# Patient Record
Sex: FEMALE | Race: WHITE | Employment: PART TIME | URBAN - METROPOLITAN AREA
[De-identification: names, ages, dates, MRNs, and addresses within clinical notes are randomized per-mention and may not be internally consistent; named-entity substitution may affect disease eponyms.]

---

## 2019-03-08 ENCOUNTER — HOSPITAL ENCOUNTER (OUTPATIENT)
Age: 39
Setting detail: SPECIMEN
Discharge: HOME | End: 2019-03-08
Payer: MEDICARE

## 2019-03-08 DIAGNOSIS — R35.0 URINARY FREQUENCY: ICD-10-CM

## 2019-03-09 LAB
CULTURE: NORMAL
Lab: NORMAL
SPECIMEN DESCRIPTION: NORMAL

## 2019-04-24 ENCOUNTER — HOSPITAL ENCOUNTER (INPATIENT)
Age: 39
LOS: 1 days | Discharge: HOME | DRG: 773 | End: 2019-04-26
Attending: EMERGENCY MEDICINE | Admitting: INTERNAL MEDICINE
Payer: MEDICARE

## 2019-04-24 ENCOUNTER — APPOINTMENT (OUTPATIENT)
Dept: CT IMAGING | Age: 39
DRG: 773 | End: 2019-04-24
Payer: MEDICARE

## 2019-04-24 DIAGNOSIS — F13.939 BENZODIAZEPINE WITHDRAWAL WITH COMPLICATION (HCC): ICD-10-CM

## 2019-04-24 DIAGNOSIS — R56.9 NEW ONSET SEIZURE (HCC): Primary | ICD-10-CM

## 2019-04-24 LAB
ABSOLUTE EOS #: 0 K/UL (ref 0–0.4)
ABSOLUTE EOS #: 0 K/UL (ref 0–0.4)
ABSOLUTE IMMATURE GRANULOCYTE: ABNORMAL K/UL (ref 0–0.3)
ABSOLUTE IMMATURE GRANULOCYTE: ABNORMAL K/UL (ref 0–0.3)
ABSOLUTE LYMPH #: 1.9 K/UL (ref 1–4.8)
ABSOLUTE LYMPH #: 2.28 K/UL (ref 1–4.8)
ABSOLUTE MONO #: 0.3 K/UL (ref 0.1–1.3)
ABSOLUTE MONO #: 0.46 K/UL (ref 0.1–1.3)
ACETAMINOPHEN LEVEL: <5 UG/ML (ref 10–30)
ALBUMIN SERPL-MCNC: 4.6 G/DL (ref 3.5–5.2)
ALBUMIN/GLOBULIN RATIO: ABNORMAL (ref 1–2.5)
ALP BLD-CCNC: 93 U/L (ref 35–104)
ALT SERPL-CCNC: 71 U/L (ref 5–33)
ANION GAP SERPL CALCULATED.3IONS-SCNC: 18 MMOL/L (ref 9–17)
AST SERPL-CCNC: 81 U/L
BASOPHILS # BLD: 0 % (ref 0–2)
BASOPHILS # BLD: 0 % (ref 0–2)
BASOPHILS ABSOLUTE: 0 K/UL (ref 0–0.2)
BASOPHILS ABSOLUTE: 0 K/UL (ref 0–0.2)
BILIRUB SERPL-MCNC: 0.23 MG/DL (ref 0.3–1.2)
BUN BLDV-MCNC: 8 MG/DL (ref 6–20)
BUN/CREAT BLD: ABNORMAL (ref 9–20)
CALCIUM SERPL-MCNC: 9.2 MG/DL (ref 8.6–10.4)
CHLORIDE BLD-SCNC: 101 MMOL/L (ref 98–107)
CO2: 20 MMOL/L (ref 20–31)
CREAT SERPL-MCNC: 0.9 MG/DL (ref 0.5–0.9)
DIFFERENTIAL TYPE: ABNORMAL
DIFFERENTIAL TYPE: ABNORMAL
EOSINOPHILS RELATIVE PERCENT: 0 % (ref 0–4)
EOSINOPHILS RELATIVE PERCENT: 0 % (ref 0–4)
ETHANOL PERCENT: <0.01 %
ETHANOL: <10 MG/DL
GFR AFRICAN AMERICAN: >60 ML/MIN
GFR NON-AFRICAN AMERICAN: >60 ML/MIN
GFR SERPL CREATININE-BSD FRML MDRD: ABNORMAL ML/MIN/{1.73_M2}
GFR SERPL CREATININE-BSD FRML MDRD: ABNORMAL ML/MIN/{1.73_M2}
GLUCOSE BLD-MCNC: 126 MG/DL (ref 65–105)
GLUCOSE BLD-MCNC: 136 MG/DL (ref 70–99)
HCT VFR BLD CALC: 35.8 % (ref 36–46)
HCT VFR BLD CALC: 36.5 % (ref 36–46)
HEMOGLOBIN: 11.3 G/DL (ref 12–16)
HEMOGLOBIN: 11.5 G/DL (ref 12–16)
IMMATURE GRANULOCYTES: ABNORMAL %
IMMATURE GRANULOCYTES: ABNORMAL %
LYMPHOCYTES # BLD: 19 % (ref 24–44)
LYMPHOCYTES # BLD: 20 % (ref 24–44)
MCH RBC QN AUTO: 24.4 PG (ref 26–34)
MCH RBC QN AUTO: 24.4 PG (ref 26–34)
MCHC RBC AUTO-ENTMCNC: 31.5 G/DL (ref 31–37)
MCHC RBC AUTO-ENTMCNC: 31.6 G/DL (ref 31–37)
MCV RBC AUTO: 77.2 FL (ref 80–100)
MCV RBC AUTO: 77.6 FL (ref 80–100)
MONOCYTES # BLD: 3 % (ref 1–7)
MONOCYTES # BLD: 4 % (ref 1–7)
MORPHOLOGY: ABNORMAL
NRBC AUTOMATED: ABNORMAL PER 100 WBC
NRBC AUTOMATED: ABNORMAL PER 100 WBC
PDW BLD-RTO: 16.7 % (ref 11.5–14.9)
PDW BLD-RTO: 17 % (ref 11.5–14.9)
PLATELET # BLD: 454 K/UL (ref 150–450)
PLATELET # BLD: 484 K/UL (ref 150–450)
PLATELET ESTIMATE: ABNORMAL
PLATELET ESTIMATE: ABNORMAL
PMV BLD AUTO: 7.2 FL (ref 6–12)
PMV BLD AUTO: 7.5 FL (ref 6–12)
POTASSIUM SERPL-SCNC: 3.8 MMOL/L (ref 3.7–5.3)
RBC # BLD: 4.63 M/UL (ref 4–5.2)
RBC # BLD: 4.7 M/UL (ref 4–5.2)
RBC # BLD: ABNORMAL 10*6/UL
RBC # BLD: ABNORMAL 10*6/UL
SALICYLATE LEVEL: <1 MG/DL (ref 3–10)
SEG NEUTROPHILS: 76 % (ref 36–66)
SEG NEUTROPHILS: 78 % (ref 36–66)
SEGMENTED NEUTROPHILS ABSOLUTE COUNT: 7.8 K/UL (ref 1.3–9.1)
SEGMENTED NEUTROPHILS ABSOLUTE COUNT: 8.66 K/UL (ref 1.3–9.1)
SODIUM BLD-SCNC: 139 MMOL/L (ref 135–144)
TOTAL PROTEIN: 7.8 G/DL (ref 6.4–8.3)
TOXIC TRICYCLIC SC,BLOOD: ABNORMAL
WBC # BLD: 10 K/UL (ref 3.5–11)
WBC # BLD: 11.4 K/UL (ref 3.5–11)
WBC # BLD: ABNORMAL 10*3/UL
WBC # BLD: ABNORMAL 10*3/UL

## 2019-04-24 PROCEDURE — 99223 1ST HOSP IP/OBS HIGH 75: CPT | Performed by: INTERNAL MEDICINE

## 2019-04-24 PROCEDURE — 80053 COMPREHEN METABOLIC PANEL: CPT

## 2019-04-24 PROCEDURE — G0378 HOSPITAL OBSERVATION PER HR: HCPCS

## 2019-04-24 PROCEDURE — 2580000003 HC RX 258: Performed by: STUDENT IN AN ORGANIZED HEALTH CARE EDUCATION/TRAINING PROGRAM

## 2019-04-24 PROCEDURE — G0480 DRUG TEST DEF 1-7 CLASSES: HCPCS

## 2019-04-24 PROCEDURE — 99254 IP/OBS CNSLTJ NEW/EST MOD 60: CPT | Performed by: PSYCHIATRY & NEUROLOGY

## 2019-04-24 PROCEDURE — 82947 ASSAY GLUCOSE BLOOD QUANT: CPT

## 2019-04-24 PROCEDURE — 93005 ELECTROCARDIOGRAM TRACING: CPT

## 2019-04-24 PROCEDURE — 70450 CT HEAD/BRAIN W/O DYE: CPT

## 2019-04-24 PROCEDURE — 99285 EMERGENCY DEPT VISIT HI MDM: CPT

## 2019-04-24 PROCEDURE — 36415 COLL VENOUS BLD VENIPUNCTURE: CPT

## 2019-04-24 PROCEDURE — 6370000000 HC RX 637 (ALT 250 FOR IP): Performed by: STUDENT IN AN ORGANIZED HEALTH CARE EDUCATION/TRAINING PROGRAM

## 2019-04-24 PROCEDURE — 80307 DRUG TEST PRSMV CHEM ANLYZR: CPT

## 2019-04-24 PROCEDURE — 85025 COMPLETE CBC W/AUTO DIFF WBC: CPT

## 2019-04-24 RX ORDER — DIAZEPAM 5 MG/1
10 TABLET ORAL EVERY 8 HOURS
Status: DISCONTINUED | OUTPATIENT
Start: 2019-04-24 | End: 2019-04-26 | Stop reason: HOSPADM

## 2019-04-24 RX ORDER — LORAZEPAM 2 MG/ML
4 INJECTION INTRAMUSCULAR
Status: DISCONTINUED | OUTPATIENT
Start: 2019-04-24 | End: 2019-04-24

## 2019-04-24 RX ORDER — LORAZEPAM 1 MG/1
1 TABLET ORAL
Status: DISCONTINUED | OUTPATIENT
Start: 2019-04-24 | End: 2019-04-24

## 2019-04-24 RX ORDER — LORAZEPAM 2 MG/ML
2 INJECTION INTRAMUSCULAR
Status: DISCONTINUED | OUTPATIENT
Start: 2019-04-24 | End: 2019-04-24

## 2019-04-24 RX ORDER — ONDANSETRON 2 MG/ML
4 INJECTION INTRAMUSCULAR; INTRAVENOUS EVERY 6 HOURS PRN
Status: DISCONTINUED | OUTPATIENT
Start: 2019-04-24 | End: 2019-04-26 | Stop reason: HOSPADM

## 2019-04-24 RX ORDER — SODIUM CHLORIDE 0.9 % (FLUSH) 0.9 %
10 SYRINGE (ML) INJECTION PRN
Status: DISCONTINUED | OUTPATIENT
Start: 2019-04-24 | End: 2019-04-26 | Stop reason: HOSPADM

## 2019-04-24 RX ORDER — LORAZEPAM 1 MG/1
2 TABLET ORAL
Status: DISCONTINUED | OUTPATIENT
Start: 2019-04-24 | End: 2019-04-24

## 2019-04-24 RX ORDER — LORAZEPAM 2 MG/ML
1 INJECTION INTRAMUSCULAR
Status: DISCONTINUED | OUTPATIENT
Start: 2019-04-24 | End: 2019-04-24

## 2019-04-24 RX ORDER — LORAZEPAM 1 MG/1
4 TABLET ORAL
Status: DISCONTINUED | OUTPATIENT
Start: 2019-04-24 | End: 2019-04-24

## 2019-04-24 RX ORDER — LORAZEPAM 1 MG/1
3 TABLET ORAL
Status: DISCONTINUED | OUTPATIENT
Start: 2019-04-24 | End: 2019-04-24

## 2019-04-24 RX ORDER — LORAZEPAM 2 MG/ML
3 INJECTION INTRAMUSCULAR
Status: DISCONTINUED | OUTPATIENT
Start: 2019-04-24 | End: 2019-04-24

## 2019-04-24 RX ORDER — SODIUM CHLORIDE 0.9 % (FLUSH) 0.9 %
10 SYRINGE (ML) INJECTION EVERY 12 HOURS SCHEDULED
Status: DISCONTINUED | OUTPATIENT
Start: 2019-04-24 | End: 2019-04-26 | Stop reason: HOSPADM

## 2019-04-24 RX ADMIN — Medication 10 ML: at 20:50

## 2019-04-24 RX ADMIN — DIAZEPAM 10 MG: 5 TABLET ORAL at 20:50

## 2019-04-24 ASSESSMENT — ENCOUNTER SYMPTOMS
CONSTIPATION: 0
DIARRHEA: 0
SHORTNESS OF BREATH: 0
CHEST TIGHTNESS: 0
BLOOD IN STOOL: 0
VOMITING: 0
PHOTOPHOBIA: 0
ABDOMINAL PAIN: 0
WHEEZING: 0
CHOKING: 0
NAUSEA: 0
COUGH: 0

## 2019-04-24 NOTE — ED NOTES
Report given to Babak Mei from [facility]. Report method in person   The following was reviewed with receiving RN:   Current vital signs:  /70   Pulse 82   Temp 97.7 °F (36.5 °C) (Oral)   Resp 16   Ht 5' 7\" (1.702 m)   Wt 203 lb (92.1 kg)   LMP 03/24/2019 (Approximate)   SpO2 94%   BMI 31.79 kg/m²                MEWS Score: 2     Any medication or safety alerts were reviewed. Any pending diagnostics and notifications were also reviewed, as well as any safety concerns or issues, abnormal labs, abnormal imaging, and abnormal assessment findings. Questions were answered.           Mine Pires RN  04/24/19 5546

## 2019-04-24 NOTE — CONSULTS
NEUROLOGY INPATIENT CONSULT NOTE    4/24/2019         Ekaterina Longoria is a  41 y.o. female admitted on 4/24/2019 with  Benzodiazepine withdrawal with complication ([facility] [address] [P25.328]      History is obtained mostly from the patient and the medical record and from the caregivers. Chart is reviewed and patient is examined. Briefly, this is a  41 y.o. right handed  female with hx of anxiety and depression was admitted on 4/24/2019 with new onset seizures. Patient was at her doctor's office for her children and she started seizing and has had a postictal state for 30 minutes. Patient could not recall any details of it. Patient was told by her mom, who witnessed the seizure. It was described as generalized tonic clonic seizure. There is no report of tongue bite or bladder incontinence. Patient denied hx of head injury or family hx of seizure disorder. Patient has been on 3-5 mg of xanax and getting off of the street. She stated that she was on Klonopin in the past for chronic anxiety as prescribed by her psychiatrist.  She was fired from the practice for noncompliance. Subsequently she started taking Xanax from wherever she can get it. About 2 days ago she decided to stop Xanax. Vitals on admit: 119/69, 109/min; 97.7F. Head CT on admit is negative. Presently she denies headaches and dizziness and vision changes. But admits to having generalized weakness but denied numbness. Denies speech and swallowing difficulties. No current facility-administered medications on file prior to encounter. Current Outpatient Medications on File Prior to Encounter   Medication Sig Dispense Refill    ALPRAZolam (XANAX) 1 MG tablet Xanax 1 mg tablet   Take 1 tablet every day by oral route as needed.  bisacodyl (DULCOLAX) 5 MG EC tablet Take 5 mg by mouth daily as needed for Constipation       methadone (DOLOPHINE) 10 MG/ML solution Take 135 mg by mouth daily.  [facility]      traZODone (DESYREL) 100 MG tablet Take 100 mg by mouth nightly       naproxen (NAPROSYN) 500 MG tablet Take 1 tablet by mouth 2 times daily (with meals) 60 tablet 0    sodium chloride 0.9 % SOLN with methadone 10 MG/ML SOLN 1 mg/mL methadone      chlorzoxazone (LORZONE) 750 MG TABS tablet Take 1 tablet by mouth 3 times daily as needed for Muscle spasms 8 tablet 0     Allergies: Ana Cristina Longoria is allergic to codeine. Past Medical History:   Diagnosis Date    Anxiety     Bipolar disorder ([facility] [address]     Depression     Hypothyroidism     Obesity     Substance abuse ([facility]        Past Surgical History:   Procedure Laterality Date     SECTION  12/10/2017    1     Social History: Ana Cristina Longoria  reports that she has been smoking cigarettes. She has never used smokeless tobacco. She reports that she drank alcohol. She reports that she has current or past drug history. History reviewed. No pertinent family history. Current Medications:      PRN Meds include:     ROS:   Constitutional Negative for fever and chills   HEENT Negative for ear discharge, ear pain, nosebleed   Eyes Negative for photophobia, pain and discharge   Respiratory Negative for hemoptysis and sputum   Cardiovascular Negative for orthopnea, claudication and PND   Gastrointestinal Negative for abdominal pain, diarrhea, blood in stool   Musculoskeletal Negative for joint pain, negative for myalgia   Skin Negative for rash or itching   Endo/heme/allergies Negative for polydipsia, environmental allergy   Psychiatric Negative for suicidal ideation.   Patient is anxious           Objective:   /66   Pulse 88   Temp 98.4 °F (36.9 °C) (Oral)   Resp 17   Ht 5' 7\" (1.702 m)   Wt 203 lb (92.1 kg)   LMP 2019 (Approximate)   SpO2 95%   BMI 31.79 kg/m²     Blood pressure range: Systolic (42PZK), MALOU:012 , Min:113 , THR:793   ; Diastolic (55ERC), QOO:94, Min:63, Max:76      Continuous infusions:     ON EXAMINATION:  GENERAL  Appears comfortable and in no distress   HEENT  NC/ AT   NECK  Supple and no bruits heard   Cardiovascular  S1, S2 heard; radial pulse intact   MENTAL STATUS:  Alert, oriented, intact memory, no confusion, normal speech, normal language, no hallucination or delusion   CRANIAL NERVES: II     -       Pupils reactive b/l; Fundus exam: intact venous pulsations; Visual fields intact to confrontation  III,IV,VI -  EOMs full, no afferent defect, no                      SHOBHA, no ptosis  V     -     Normal facial sensation  VII    -     Normal facial symmetry  VIII   -     Intact hearing  IX,X -     Symmetrical palate  XI    -     Symmetrical shoulder shrug  XII   -     Midline tongue, no atrophy    MOTOR FUNCTION:  Normal bulk, normal tone, normal power;  no involuntary movements, no tremor   SENSORY FUNCTION:  Normal touch, normal pin, normal vibration, normal proprioception   CEREBELLAR FUNCTION:  Intact fine motor control over upper limbs   REFLEX FUNCTION:  Symmetric, no perverted reflex, no Babinski sign   STATION and GAIT  Not tested         Data:    Lab Results:     Lab Results   Component Value Date    ALT 71 (H) 04/24/2019    AST 81 (H) 04/24/2019     Hematology:  Recent Labs     04/24/19  1357   WBC 10.0   HGB 11.5*   HCT 36.5   *     Chemistry:  Recent Labs     04/24/19  1357      K 3.8      CO2 20   GLUCOSE 136*   BUN 8   CREATININE 0.90   CALCIUM 9.2     Recent Labs     04/24/19  1357   PROT 7.8   LABALBU 4.6   AST 81*   ALT 71*       No results found for: PHENYTOIN, PHENYTOIN, VALPROATE, CBMZ        Diagnostic data reviewed:  CT head 4/24/19: No acute intracranial abnormality. Toxicology 4/24/19: pending. Impression and Plan: Ms. Dulce Esposito is a 41 y.o. female with   New onset seizure; benzodiazepine withdrawal seizure; would not recommend initiating her on AED. will get EEG and decide accordingly.  Until then, continue seizure precautions; IV Lorazepam 1mg q 30 min prn prolonged seizure activity, max 4mg/ day  Chronic anxiety disorder/ bipolar disorder; might benefit from psych eval and also to initiate clonazepam 0.5 mg bid but I will defer it to primary team.   Hx of substance abuse    Discussed with patient and Nurse. Will follow with you. Thank you for consultation.

## 2019-04-24 NOTE — ED NOTES
Pt arrives via EMS. Per report, pt at MD office for kamla appt- sitting in waiting room when she lost consciousness/began foaming at the mouth. Pt does not remember event. Pt denies seizure HX. Pt AOx4. RR easy, unlabored. FSBG 126. Pt denies any pain. Per witness report, pt did not strike head- event occurred while pt sitting in chair.       Sharyle Messing, RN  04/24/19 6773

## 2019-04-24 NOTE — PROGRESS NOTES
Pt arrived to floor via stretcher from ED and was transfered to bed. Vitals taken. Admission and assessment complete EXCEPT FOR HOME MED LIST REVIEW BECAUSE THE PATIENT DIDN'T KNOW HER MEDICATIONS. No distress noted. See doc flowsheet and admission navigator for details. POC and education initiated and reviewed with patient. Call light within reach, and pt educated on its use. Bed in lowest position, and locked. Side rails up x 2. Denied further questions or needs at this time. Will continue to monitor.

## 2019-04-24 NOTE — ED PROVIDER NOTES
[address] [facility]  eMERGENCY dEPARTMENT eNCOUnter      Pt Name: Ledy Schultz  MRN: [id number]  [place] 1980  Date of evaluation: 19      CHIEF COMPLAINT     Generalized tonic-clonic seizure. HISTORY OF PRESENT ILLNESS   HPI 41 y.o. female presents with complaints of a generalized tonic-clonic seizure. The patient states that she was at the doctor's office, where she had taken her children to get evaluated for cold, when she went into a generalized tonic-clonic seizure while sitting in the waiting room. She did not receive any medications. EMS was called and she was transported to the emergency department. Patient denies any seizure history. She says that she's been buying Xanax off the street for the last year and a half, taking about 3 mg a day for anxiety. She says that she ran out of Xanax about 3 days ago, and that she didn't sleep at all last night because her children are sick with cold. She denies any fevers or chills or headache. She denies biting her tongue. There is no bowel or bladder incontinence. She denies any other recreational drug abuse. REVIEW OF SYSTEMS     Review of Systems   Constitutional: Negative for chills and fever. HENT: Negative for congestion. Eyes: Negative for visual disturbance. Respiratory: Negative for cough. Cardiovascular: Negative for chest pain. Gastrointestinal: Negative for abdominal pain, nausea and vomiting. Genitourinary: Negative for dysuria. Musculoskeletal: Negative for arthralgias. Skin: Negative for rash. Neurological: Positive for seizures. Negative for headaches. Hematological: Negative for adenopathy.          PAST MEDICAL HISTORY     Past Medical History:   Diagnosis Date    Anxiety     Bipolar disorder ([facility] [address]     Depression     Hypothyroidism     Obesity     Substance abuse ([facility] [address]        SURGICAL HISTORY       Past Surgical History:   Procedure Laterality Date     SECTION  12/10/2017    1       CURRENT MEDICATIONS       Current Discharge Medication List      CONTINUE these medications which have NOT CHANGED    Details   bisacodyl (DULCOLAX) 5 MG EC tablet Take 5 mg by mouth daily as needed for Constipation       methadone (DOLOPHINE) 10 MG/ML solution Take 135 mg by mouth daily. [facility]      traZODone (DESYREL) 100 MG tablet Take 100 mg by mouth nightly       naproxen (NAPROSYN) 500 MG tablet Take 1 tablet by mouth 2 times daily (with meals)  Qty: 60 tablet, Refills: 0    Associated Diagnoses: Chronic bilateral low back pain without sciatica      sodium chloride 0.9 % SOLN with methadone 10 MG/ML SOLN 1 mg/mL methadone             ALLERGIES     is allergic to codeine. FAMILY HISTORY     has no family status information on file. SOCIAL HISTORY      reports that she has been smoking cigarettes. She has never used smokeless tobacco. She reports that she drank alcohol. She reports that she has current or past drug history.     PHYSICAL EXAM     INITIAL VITALS: /64   Pulse 74   Temp 98.1 °F (36.7 °C) (Oral)   Resp 17   Ht 5' 7\" (1.702 m)   Wt 203 lb (92.1 kg)   LMP 03/24/2019 (Approximate)   SpO2 97%   BMI 31.79 kg/m²   Gen.: NAD  Head: Normocephalic, atraumatic  Eye: Pupils equal round reactive to light, no conjunctivitis  Heart: Regular rate and rhythm no murmurs  Lungs: Clear to auscultation bilaterally, no respiratory distress  Chest wall: No crepitus, no tenderness palpation  Abdomen: Soft, nontender, nondistended, with no peritoneal signs  MSK: No CVA TTP  Neurologic: Patient is alert and oriented x3,   Cranial nerves II through XII are normal  motor and sensation is intact in all 4 extremities, cerebellar function is normal finger-nose testing, and heel-to-shin testing  Speech is fluent  Extremities: Full range of motion, no cyanosis, no edema, no signs of trauma, no tenderness to palpation    MEDICAL DECISION MAKING:     MDM  This is a 62-year-old presenting with a suspected benzodiazepine withdrawal seizure. We'll get a CT scan of the head to make sure that there is no brain mass. We'll check his electrolytes to make sure there is no hyponatremia or other electrolyte abnormality. We will monitor and reassess. ED Course as of Apr 25 0047 Wed Apr 24, 2019   1602 Which were studies reviewed and unremarkable. Patient is reassessed, she remained seizure free here in the emergency department. CT scan of the head shows no bleeding or brain mass    Spoke with the neurology staff Dr. Ariadne Cotter, he would like the patient admitted to their PCP, again EEG, and monitor for any seizures, treat with benzodiazepines if they develop. Updated the patient, she is in agreement with the treatment plan    Discussed with the staff medicine service, on-call for Dr. Juan David Marrero, who is currently out of town, patient will be admitted to the hospital.    [KW]      ED Course User Index  [KW] Marley Mckenzie MD         DIAGNOSTIC RESULTS   EKG: EKG shows a sinus rhythm. HR is 83, , QRS 86, , no GLORIA, No STD,diffuse t-wave flattening, the axis is normal.        RADIOLOGY:All plain film, CT, MRI, and formal ultrasound images (except ED bedside ultrasound) are read by the radiologist and the images and interpretations are directly viewed by the emergency physician. CT Head WO Contrast   Final Result   No acute intracranial abnormality. LABS: All lab results were reviewed by myself, and all abnormals are listed below.   Labs Reviewed   CBC WITH AUTO DIFFERENTIAL - Abnormal; Notable for the following components:       Result Value    Hemoglobin 11.5 (*)     MCV 77.6 (*)     MCH 24.4 (*)     RDW 17.0 (*)     Platelets 295 (*)     Seg Neutrophils 78 (*)     Lymphocytes 19 (*)     All other components within normal limits   COMPREHENSIVE METABOLIC PANEL - Abnormal; Notable for the following components:    Glucose 136 (*)     Anion Gap 18 (*)     ALT 71 (*)     AST 81 (*) Total Bilirubin 0.23 (*)     All other components within normal limits   TOX SCR, BLD, ED - Abnormal; Notable for the following components:    Salicylate Lvl <1 (*)     Acetaminophen Level <5 (*)     All other components within normal limits   CBC WITH AUTO DIFFERENTIAL - Abnormal; Notable for the following components:    WBC 11.4 (*)     Hemoglobin 11.3 (*)     Hematocrit 35.8 (*)     MCV 77.2 (*)     MCH 24.4 (*)     RDW 16.7 (*)     Platelets 670 (*)     Seg Neutrophils 76 (*)     Lymphocytes 20 (*)     All other components within normal limits   POC GLUCOSE FINGERSTICK - Abnormal; Notable for the following components:    POC Glucose 126 (*)     All other components within normal limits   DRUG SCREEN TRICYCLIC URINE   BASIC METABOLIC PANEL W/ REFLEX TO MG FOR LOW K       EMERGENCY DEPARTMENT COURSE:   Vitals:    Vitals:    04/24/19 1630 04/24/19 1746 04/24/19 2026 04/24/19 2357   BP: 129/63 126/66 (!) 145/87 113/64   Pulse: 84 88 97 74   Resp: 18 17 17 17   Temp: 98.4 °F (36.9 °C) 98.4 °F (36.9 °C) 98.2 °F (36.8 °C) 98.1 °F (36.7 °C)   TempSrc: Oral Oral Oral Oral   SpO2: 97% 95% 93% 97%   Weight:       Height:           The patient was given the following medications while in the emergency department:  Orders Placed This Encounter   Medications    sodium chloride flush 0.9 % injection 10 mL    sodium chloride flush 0.9 % injection 10 mL    magnesium hydroxide (MILK OF MAGNESIA) 400 MG/5ML suspension 30 mL    ondansetron (ZOFRAN) injection 4 mg    DISCONTD: LORazepam (ATIVAN) tablet 1 mg    DISCONTD: LORazepam (ATIVAN) injection 1 mg    DISCONTD: LORazepam (ATIVAN) tablet 2 mg    DISCONTD: LORazepam (ATIVAN) injection 2 mg    DISCONTD: LORazepam (ATIVAN) tablet 3 mg    DISCONTD: LORazepam (ATIVAN) injection 3 mg    DISCONTD: LORazepam (ATIVAN) tablet 4 mg    DISCONTD: LORazepam (ATIVAN) injection 4 mg    diazepam (VALIUM) tablet 10 mg     -------------------------  CRITICAL CARE:   CONSULTS: IP CONSULT TO NEUROLOGY  IP CONSULT TO INTERNAL MEDICINE  IP CONSULT TO SOCIAL WORK  PROCEDURES: Procedures     FINAL IMPRESSION      1.  New onset seizure [facility])    2. Benzodiazepine withdrawal with complication [facility])          DISPOSITION/PLAN   DISPOSITION Admitted 04/24/2019 04:40:48 PM      PATIENT REFERRED TO:  Deb Maynard PA-C  [address] [facility]  [address] [facility],[address]  [address] [facility] [address] [phone]            DISCHARGE MEDICATIONS:  Current Discharge Medication List            Sameera Parra MD  Attending Emergency Physician                      Sameera Parra MD  04/25/19 4990       Sameera Parra MD  04/26/19 5131

## 2019-04-24 NOTE — PROGRESS NOTES
Medication History completed:    New medications: none    Medications discontinued: alprazolam, chlorzoxazone    Changes to dosing: none    Stated allergies: As listed    Other pertinent information: The patient states she has been getting xanax \"off the street\" and last took this two days ago. She gets her methadone at [facility] which is currently closed.      Thank you,  Dileep Mckeon, PharmD  [phone]

## 2019-04-24 NOTE — H&P
[address] [facility] [address]      [address]     HISTORY AND PHYSICAL EXAMINATION            Date:   2019  Patient name:  Worley Dandy Ringer  Date of admission:  2019  1:43 PM  MRN:   [id number]  Account:  [de-identified]  YOB: 1980  PCP:    Coral Martinez PA-C  Room:   [address] [place] [address]  Code Status:    No Order    Chief Complaint:     Benzodiazepine withdrawal with seizures    History Obtained From:     patient, domestic partner    History of Present Illness:     Patient is a is 61-year-old female with a past medical history of anxiety and depression presenting today with seizures. Patient has been states that patient was at [address] [place] [address] office for her children when she started seizing and had postictal state for 30 minutes. Patient was previously taking 3-5 mg Xanax daily off the street. 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  2 days ago patient decided to stop. Last dose was 3 mg 2 days ago. Patient denies any head trauma, tremors. Patient has no focal neurological deficits. Then today had seizure lasting for an unspecified amount of time. Postictal for 30 minutes was witnessed. In the ED, vitals were stable. CMP showed a patient with ALT and AST. EKG was negative for acute change. CT head was negative for acute intracranial abnormality. Neurology was consulted, requested EEG.   Patient is admitted for further management of benzodiazepine withdrawal.    Past Medical History:     Past Medical History:   Diagnosis Date    Anxiety     B[facility] disorder ([facility]     Depression     Hypothyroidism     Obesity     [facility]         Past SurgicalHistory:     Past Surgical History:   Procedure Laterality Date     SECTION 12/10/2017    1        Medications Prior to Admission:        Prior to Admission medications    Medication Sig Start Date End Date Taking? Authorizing Provider   ALPRAZolam [facility]) 1 MG tablet Xanax 1 mg tablet   Take 1 tablet every day by oral route as needed. Yes Historical Provider, MD   bisacodyl (DULCOLAX) 5 MG EC tablet Take 5 mg by mouth daily as needed for Constipation    Yes Historical Provider, MD   methadone (DOLOPHINE) 10 MG/ML solution Take 135 [facility]   Yes Historical Provider, MD   traZODone (DESYREL) 100 MG tablet Take 100 mg by mouth nightly  2/1/18  Yes Historical Provider, MD   naproxen (NAPROSYN) 500 MG tablet Take 1 tablet by mouth 2 times daily (with meals) 3/8/19  Yes Bernadette Anders PA-C   sodium chloride 0.9 % SOLN with methadone 10 MG/ML SOLN 1 mg/mL methadone    Historical Provider, MD   chlorzoxazone (LORZONE) 750 MG TABS tablet Take 1 tablet by mouth 3 times daily as needed for Muscle spasms 3/8/19   Bernadette Anders PA-C        Allergies:     Codeine    Social History:     Tobacco:    reports that she has been smoking cigarettes. She has never used smokeless tobacco.  Alcohol:      reports that she drank alcohol. Drug Use:  reports that she has current or past drug history. Family History:     History reviewed. No pertinent family history. Review of Systems:     Positive and Negative as described in HPI. Review of Systems   Constitutional: Positive for diaphoresis. Negative for activity change, appetite change, chills, fatigue and fever. Eyes: Negative for photophobia and visual disturbance. Respiratory: Negative for cough, choking, chest tightness, shortness of breath and wheezing. Cardiovascular: Negative for chest pain and palpitations. Gastrointestinal: Negative for abdominal pain, blood in stool, constipation, diarrhea, nausea and vomiting. Genitourinary: Negative for dysuria, enuresis and urgency.    Neurological: Positive for dizziness, seizures, weakness and headaches. Negative for facial asymmetry, speech difficulty, light-headedness and numbness. Psychiatric/Behavioral: Negative for behavioral problems, confusion, hallucinations, self-injury, sleep disturbance and suicidal ideas. The patient is nervous/anxious. The patient is not hyperactive. Physical Exam:   /63   Pulse 84   Temp 98.4 °F (36.9 °C) (Oral)   Resp 18   Ht 5' 7\" (1.702 m)   Wt 203 lb (92.1 kg)   LMP 2019 (Approximate)   SpO2 97%   BMI 31.79 kg/m²   Temp (24hrs), Av.1 °F (36.7 °C), Min:97.7 °F (36.5 °C), Max:98.4 °F (36.9 °C)    Recent Labs     19  1344   POCGLU 126*     No intake or output data in the 24 hours ending 19 1735    Physical Exam   Constitutional: She is oriented to person, place, and time. She appears well-developed and well-nourished. HENT:   Head: Normocephalic and atraumatic. Eyes: Pupils are equal, round, and reactive to light. EOM are normal.   Cardiovascular: Normal rate, regular rhythm and normal heart sounds. Pulmonary/Chest: Effort normal and breath sounds normal.   Abdominal: Soft. Bowel sounds are normal. She exhibits no distension. There is no tenderness. Musculoskeletal: Normal range of motion. She exhibits no edema. Neurological: She is alert and oriented to person, place, and time. She displays normal reflexes. No cranial nerve deficit or sensory deficit. She exhibits normal muscle tone. Coordination normal.   Skin: Skin is warm and dry. No erythema. Nursing note and vitals reviewed.       Investigations:     Laboratory Testing:  Recent Results (from the past 24 hour(s))   POC Glucose Fingerstick    Collection Time: 19  1:44 PM   Result Value Ref Range    POC Glucose 126 (H) 65 - 105 mg/dL   CBC with DIFF    Collection Time: 19  1:57 PM   Result Value Ref Range    WBC 10.0 3.5 - 11.0 k/uL    RBC 4.70 4.0 - 5.2 m/uL    Hemoglobin 11.5 (L) 12.0 - 16.0 g/dL    Hematocrit 36.5 36 - 46 %    MCV 77.6 (L) 80 - 100 fL    MCH 24.4 (L) 26 - 34 pg    MCHC 31.5 31 - 37 g/dL    RDW 17.0 (H) 11.5 - 14.9 %    Platelets 631 (H) 260 - 450 k/uL    MPV 7.5 6.0 - 12.0 fL    NRBC Automated NOT REPORTED per 100 WBC    Differential Type NOT REPORTED     Immature Granulocytes NOT REPORTED 0 %    Absolute Immature Granulocyte NOT REPORTED 0.00 - 0.30 k/uL    WBC Morphology NOT REPORTED     RBC Morphology NOT REPORTED     Platelet Estimate NOT REPORTED     Seg Neutrophils 78 (H) 36 - 66 %    Lymphocytes 19 (L) 24 - 44 %    Monocytes 3 1 - 7 %    Eosinophils % 0 0 - 4 %    Basophils 0 0 - 2 %    Segs Absolute 7.80 1.3 - 9.1 k/uL    Absolute Lymph # 1.90 1.0 - 4.8 k/uL    Absolute Mono # 0.30 0.1 - 1.3 k/uL    Absolute Eos # 0.00 0.0 - 0.4 k/uL    Basophils # 0.00 0.0 - 0.2 k/uL    Morphology ANISOCYTOSIS PRESENT     Morphology HYPOCHROMIA PRESENT     Morphology 1+ ELLIPTOCYTES     Morphology 1+ POLYCHROMASIA     Morphology 1+ TEARDROPS    Comprehensive Metabolic Panel    Collection Time: 04/24/19  1:57 PM   Result Value Ref Range    Glucose 136 (H) 70 - 99 mg/dL    BUN 8 6 - 20 mg/dL    CREATININE 0.90 0.50 - 0.90 mg/dL    Bun/Cre Ratio NOT REPORTED 9 - 20    Calcium 9.2 8.6 - 10.4 mg/dL    Sodium 139 135 - 144 mmol/L    Potassium 3.8 3.7 - 5.3 mmol/L    Chloride 101 98 - 107 mmol/L    CO2 20 20 - 31 mmol/L    Anion Gap 18 (H) 9 - 17 mmol/L    Alkaline Phosphatase 93 35 - 104 U/L    ALT 71 (H) 5 - 33 U/L    AST 81 (H) <32 U/L    Total Bilirubin 0.23 (L) 0.3 - 1.2 mg/dL    Total Protein 7.8 6.4 - 8.3 g/dL    Alb 4.6 3.5 - 5.2 g/dL    Albumin/Globulin Ratio NOT REPORTED 1.0 - 2.5    GFR Non-African American >60 >60 mL/min    GFR African American >60 >60 mL/min    GFR Comment          GFR Staging NOT REPORTED    TOX SCR, BLD, ED    Collection Time: 04/24/19  1:57 PM   Result Value Ref Range    Ethanol <10 <10 mg/dL    Ethanol percent <1.620 %    Salicylate Lvl <1 (L) 3 - 10 mg/dL    Acetaminophen Level <5 (L) 10 - 30 appreciate recommendations    Neurovascular nursing checks  Seizure precautions  Hx of substance abuse    Patient has methadone, nurse to verify dose  Will prescribe accordingly    Lovenox for VTE prophylaxis      Consultations:   IP CONSULT TO NEUROLOGY  IP CONSULT TO INTERNAL MEDICINE  IP CONSULT TO SOCIAL WORK    Patient is admitted as inpatient status because of co-morbiditieslisted above, severity of signs and symptoms as outlined, requirement for current medical therapies and most importantly because of direct risk to patient if care not provided in a hospital setting.     Parveen Mendoza MD  4/24/2019  5:35 PM    Copy sent to Dr. eJrad Nguyen PA-C

## 2019-04-25 LAB
ANION GAP SERPL CALCULATED.3IONS-SCNC: 12 MMOL/L (ref 9–17)
BUN BLDV-MCNC: 8 MG/DL (ref 6–20)
BUN/CREAT BLD: NORMAL (ref 9–20)
CALCIUM SERPL-MCNC: 8.6 MG/DL (ref 8.6–10.4)
CHLORIDE BLD-SCNC: 103 MMOL/L (ref 98–107)
CO2: 24 MMOL/L (ref 20–31)
CREAT SERPL-MCNC: 0.74 MG/DL (ref 0.5–0.9)
GFR AFRICAN AMERICAN: >60 ML/MIN
GFR NON-AFRICAN AMERICAN: >60 ML/MIN
GFR SERPL CREATININE-BSD FRML MDRD: NORMAL ML/MIN/{1.73_M2}
GFR SERPL CREATININE-BSD FRML MDRD: NORMAL ML/MIN/{1.73_M2}
GLUCOSE BLD-MCNC: 97 MG/DL (ref 70–99)
POTASSIUM SERPL-SCNC: 3.9 MMOL/L (ref 3.7–5.3)
SODIUM BLD-SCNC: 139 MMOL/L (ref 135–144)

## 2019-04-25 PROCEDURE — 6370000000 HC RX 637 (ALT 250 FOR IP): Performed by: STUDENT IN AN ORGANIZED HEALTH CARE EDUCATION/TRAINING PROGRAM

## 2019-04-25 PROCEDURE — 95816 EEG AWAKE AND DROWSY: CPT | Performed by: PSYCHIATRY & NEUROLOGY

## 2019-04-25 PROCEDURE — 2580000003 HC RX 258: Performed by: STUDENT IN AN ORGANIZED HEALTH CARE EDUCATION/TRAINING PROGRAM

## 2019-04-25 PROCEDURE — 36415 COLL VENOUS BLD VENIPUNCTURE: CPT

## 2019-04-25 PROCEDURE — 80048 BASIC METABOLIC PNL TOTAL CA: CPT

## 2019-04-25 PROCEDURE — 6370000000 HC RX 637 (ALT 250 FOR IP): Performed by: INTERNAL MEDICINE

## 2019-04-25 PROCEDURE — 2060000000 HC ICU INTERMEDIATE R&B

## 2019-04-25 PROCEDURE — 95816 EEG AWAKE AND DROWSY: CPT

## 2019-04-25 PROCEDURE — 99239 HOSP IP/OBS DSCHRG MGMT >30: CPT | Performed by: INTERNAL MEDICINE

## 2019-04-25 PROCEDURE — 99232 SBSQ HOSP IP/OBS MODERATE 35: CPT | Performed by: PSYCHIATRY & NEUROLOGY

## 2019-04-25 RX ORDER — METHADONE HYDROCHLORIDE 10 MG/5ML
90 SOLUTION ORAL DAILY
Status: DISCONTINUED | OUTPATIENT
Start: 2019-04-25 | End: 2019-04-26 | Stop reason: HOSPADM

## 2019-04-25 RX ORDER — METHADONE HYDROCHLORIDE 10 MG/ML
90 CONCENTRATE ORAL DAILY
Status: DISCONTINUED | OUTPATIENT
Start: 2019-04-25 | End: 2019-04-25 | Stop reason: SDUPTHER

## 2019-04-25 RX ADMIN — DIAZEPAM 10 MG: 5 TABLET ORAL at 15:10

## 2019-04-25 RX ADMIN — DIAZEPAM 10 MG: 5 TABLET ORAL at 06:35

## 2019-04-25 RX ADMIN — DIAZEPAM 10 MG: 5 TABLET ORAL at 22:19

## 2019-04-25 RX ADMIN — Medication 10 ML: at 22:41

## 2019-04-25 RX ADMIN — METHADONE HYDROCHLORIDE 90 MG: 10 SOLUTION ORAL at 11:34

## 2019-04-25 RX ADMIN — SERTRALINE HYDROCHLORIDE 25 MG: 50 TABLET ORAL at 16:32

## 2019-04-25 RX ADMIN — Medication 10 ML: at 09:39

## 2019-04-25 ASSESSMENT — PAIN SCALES - GENERAL
PAINLEVEL_OUTOF10: 4
PAINLEVEL_OUTOF10: 9

## 2019-04-25 NOTE — PROGRESS NOTES
NEUROLOGY INPATIENT FOLLOW UP NOTE    4/25/2019         Ag Longoria is a  41 y.o. female admitted on 4/24/2019 with  Benzodiazepine withdrawal with complication [facility] [V42.436]  Chart reviewed. Discussed with caregivers. No acute issues overnight. No recurrence of seizure activity. Briefly, this is a  41 y.o. right handed  female with hx of anxiety and depression was admitted on 4/24/2019 with new onset seizures. Patient was at her doctor's office for her children and she started seizing and has had a postictal state for 30 minutes. Patient could not recall any details of it. Patient was told by her mom, who witnessed the seizure. It was described as generalized tonic clonic seizure. There is no report of tongue bite or bladder incontinence. Patient denied hx of head injury or family hx of seizure disorder. Patient has been on 3-5 mg of xanax and getting off of the street. She stated that she was on Klonopin in the past for chronic anxiety as prescribed by her psychiatrist.  She was fired from the practice for noncompliance. Subsequently she started taking Xanax from wherever she can get it. About 2 days ago she decided to stop Xanax. Vitals on admit: 119/69, 109/min; 97.7F. Head CT on admit is negative. Presently she denies headaches and dizziness and vision changes. But admits to having generalized weakness but denied numbness. Denies speech and swallowing difficulties. No current facility-administered medications on file prior to encounter. Current Outpatient Medications on File Prior to Encounter   Medication Sig Dispense Refill    bisacodyl (DULCOLAX) 5 MG EC tablet Take 5 mg by mouth daily as needed for Constipation       methadone (DOLOPHINE) 10 MG/ML solution Take 135 mg by mouth daily.  [facility]      traZODone (DESYREL) 100 MG tablet Take 100 mg by mouth nightly       naproxen (NAPROSYN) 500 MG tablet Take 1 tablet by mouth 2 times daily (with meals) 60 tablet 0    sodium chloride 0.9 % SOLN with methadone 10 MG/ML SOLN 1 mg/mL methadone       Allergies: Ana Cristina Longoria is allergic to codeine. Past Medical History:   Diagnosis Date    Anxiety     [facility] ([facility]     Depression     Hypothyroidism     New onset seizure ([facility]     Obesity     [facility]        Past Surgical History:   Procedure Laterality Date     SECTION  12/10/2017    1     Social History: Ana Cristina Longoria  reports that she has been smoking cigarettes. She has never used smokeless tobacco. She reports that she drank alcohol. She reports that she has current or past drug history. History reviewed. No pertinent family history. Current Medications:      PRN Meds include:     ROS:   Constitutional Negative for fever and chills   HEENT Negative for ear discharge, ear pain, nosebleed   Eyes Negative for photophobia, pain and discharge   Respiratory Negative for hemoptysis and sputum   Cardiovascular Negative for orthopnea, claudication and PND   Gastrointestinal Negative for abdominal pain, diarrhea, blood in stool   Musculoskeletal Negative for joint pain, negative for myalgia   Skin Negative for rash or itching   Endo/heme/allergies Negative for polydipsia, environmental allergy   Psychiatric Negative for suicidal ideation.   Patient is anxious           Objective:   /64   Pulse 76   Temp 98.1 °F (36.7 °C) (Oral)   Resp 17   Ht 5' 7\" (1.702 m)   Wt 201 lb 11.5 oz (91.5 kg)   LMP 2019 (Approximate)   SpO2 96%   BMI 31.59 kg/m²     Blood pressure range: Systolic (86AXW), IFN:419 , Min:113 , DQS:285   ; Diastolic (32LAE), AHJ:22, Min:63, Max:87      Continuous infusions:     ON EXAMINATION:  GENERAL  Appears comfortable and in no distress   HEENT  NC/ AT   NECK  Supple and no bruits heard   Cardiovascular  S1, S2 heard; radial pulse intact   MENTAL STATUS:  Alert, oriented, intact memory, no confusion, normal speech, normal language, no hallucination or

## 2019-04-25 NOTE — FLOWSHEET NOTE
· Facts: Patient talked about her panic attacks and going through withdrawal from drug she had been taking. · Feelings: She appeared anxious as she moved about the bed and kept twisting her hair. · Family: Patient said she and her  and five children live in the basement of her mother's home    · Maura: She said she prays and asks God to help her. Prayer offered     Follow-up: Spiritual care available to follow as needed     04/25/19 1530   Encounter Summary   Services provided to: Patient   Referral/Consult From: [address] [place] [address] Spouse;Parent   Continue Visiting   (4/25/19)   Complexity of Encounter Moderate   Length of Encounter 15 minutes   Spiritual Assessment Completed Yes   Routine   Type Initial   Assessment Approachable; Anxious   Intervention Active listening;Explored feelings, thoughts, concerns; Discussed relaxation techniques with prayer; Explored coping resources;Prayer;Sustaining presence/ Ministry of presence; Discussed relationship with God;Discussed illness/injury and it's impact   Outcome Acceptance;Engaged in conversation;Expressed feelings/needs/concerns;Receptive

## 2019-04-25 NOTE — PLAN OF CARE
Problem: Falls - Risk of:  Goal: Will remain free from falls  Description  Will remain free from falls  Outcome: Ongoing Pt assessed as a fall risk this shift. Remains free from falls and accidental injury at this time. Fall precautions in place, including falling star sign and fall risk band on pt. Floor free from obstacles, and bed is locked and in lowest position. Adequate lighting provided. Pt encouraged to call before getting OOB for any need. Bed alarm activated.  Will continue to monitor needs during hourly rounding, and reinforce education on use of call light.'  Goal: Absence of physical injury  Description  Absence of physical injury  Outcome: Ongoing     Problem: Cardiac Output - Decreased:  Goal: Hemodynamic stability will improve  Description  Hemodynamic stability will improve  Outcome: Ongoing

## 2019-04-25 NOTE — PROGRESS NOTES
Physical Therapy  DATE: 2019    NAME: Rebel Avila  MRN: [id number]   : 1980    Patient not seen this date for Physical Therapy due to:  [] Blood transfusion in progress  [] Hemodialysis  []  Patient Declined  [] Spine Precautions   [] Strict Bedrest  [] Surgery/ Procedure  [] Testing      [] Other        [x] PT being discontinued at this time. Patient independent. No further needs. Therapist observed pt walk to doorway and back. Pt denies any needs for further therapy services   [] PT being discontinued at this time as the patient has been transferred to palliative care. No further needs.     SANJUANITA Rosenthal  The above documentation completed by Student Physical Therapist Mirta Isaac was reviewed and accepted by the supervising Clinical instructor [facility], PT.

## 2019-04-26 VITALS
RESPIRATION RATE: 16 BRPM | HEART RATE: 80 BPM | DIASTOLIC BLOOD PRESSURE: 72 MMHG | BODY MASS INDEX: 31.66 KG/M2 | HEIGHT: 67 IN | WEIGHT: 201.72 LBS | OXYGEN SATURATION: 94 % | SYSTOLIC BLOOD PRESSURE: 115 MMHG | TEMPERATURE: 98 F

## 2019-04-26 LAB
ABSOLUTE EOS #: 0 K/UL (ref 0–0.4)
ABSOLUTE IMMATURE GRANULOCYTE: ABNORMAL K/UL (ref 0–0.3)
ABSOLUTE LYMPH #: 2.5 K/UL (ref 1–4.8)
ABSOLUTE MONO #: 0.6 K/UL (ref 0.1–1.3)
ANION GAP SERPL CALCULATED.3IONS-SCNC: 11 MMOL/L (ref 9–17)
BASOPHILS # BLD: 0 % (ref 0–2)
BASOPHILS ABSOLUTE: 0 K/UL (ref 0–0.2)
BUN BLDV-MCNC: 11 MG/DL (ref 6–20)
BUN/CREAT BLD: ABNORMAL (ref 9–20)
CALCIUM SERPL-MCNC: 8.5 MG/DL (ref 8.6–10.4)
CHLORIDE BLD-SCNC: 100 MMOL/L (ref 98–107)
CO2: 26 MMOL/L (ref 20–31)
CREAT SERPL-MCNC: 0.94 MG/DL (ref 0.5–0.9)
DIFFERENTIAL TYPE: ABNORMAL
EOSINOPHILS RELATIVE PERCENT: 0 % (ref 0–4)
GFR AFRICAN AMERICAN: >60 ML/MIN
GFR NON-AFRICAN AMERICAN: >60 ML/MIN
GFR SERPL CREATININE-BSD FRML MDRD: ABNORMAL ML/MIN/{1.73_M2}
GFR SERPL CREATININE-BSD FRML MDRD: ABNORMAL ML/MIN/{1.73_M2}
GLUCOSE BLD-MCNC: 90 MG/DL (ref 70–99)
HCT VFR BLD CALC: 38.1 % (ref 36–46)
HEMOGLOBIN: 11.8 G/DL (ref 12–16)
IMMATURE GRANULOCYTES: ABNORMAL %
LYMPHOCYTES # BLD: 23 % (ref 24–44)
MCH RBC QN AUTO: 24.1 PG (ref 26–34)
MCHC RBC AUTO-ENTMCNC: 31 G/DL (ref 31–37)
MCV RBC AUTO: 77.6 FL (ref 80–100)
MONOCYTES # BLD: 5 % (ref 1–7)
NRBC AUTOMATED: ABNORMAL PER 100 WBC
PDW BLD-RTO: 17.1 % (ref 11.5–14.9)
PLATELET # BLD: 454 K/UL (ref 150–450)
PLATELET ESTIMATE: ABNORMAL
PMV BLD AUTO: 7.6 FL (ref 6–12)
POTASSIUM SERPL-SCNC: 3.8 MMOL/L (ref 3.7–5.3)
RBC # BLD: 4.91 M/UL (ref 4–5.2)
RBC # BLD: ABNORMAL 10*6/UL
SEG NEUTROPHILS: 72 % (ref 36–66)
SEGMENTED NEUTROPHILS ABSOLUTE COUNT: 7.8 K/UL (ref 1.3–9.1)
SODIUM BLD-SCNC: 137 MMOL/L (ref 135–144)
WBC # BLD: 10.9 K/UL (ref 3.5–11)
WBC # BLD: ABNORMAL 10*3/UL

## 2019-04-26 PROCEDURE — 6370000000 HC RX 637 (ALT 250 FOR IP): Performed by: STUDENT IN AN ORGANIZED HEALTH CARE EDUCATION/TRAINING PROGRAM

## 2019-04-26 PROCEDURE — 36415 COLL VENOUS BLD VENIPUNCTURE: CPT

## 2019-04-26 PROCEDURE — 95819 EEG AWAKE AND ASLEEP: CPT

## 2019-04-26 PROCEDURE — 2580000003 HC RX 258: Performed by: STUDENT IN AN ORGANIZED HEALTH CARE EDUCATION/TRAINING PROGRAM

## 2019-04-26 PROCEDURE — 95819 EEG AWAKE AND ASLEEP: CPT | Performed by: PSYCHIATRY & NEUROLOGY

## 2019-04-26 PROCEDURE — 80048 BASIC METABOLIC PNL TOTAL CA: CPT

## 2019-04-26 PROCEDURE — 99232 SBSQ HOSP IP/OBS MODERATE 35: CPT | Performed by: INTERNAL MEDICINE

## 2019-04-26 PROCEDURE — 6370000000 HC RX 637 (ALT 250 FOR IP): Performed by: INTERNAL MEDICINE

## 2019-04-26 PROCEDURE — 85025 COMPLETE CBC W/AUTO DIFF WBC: CPT

## 2019-04-26 PROCEDURE — 99232 SBSQ HOSP IP/OBS MODERATE 35: CPT | Performed by: PSYCHIATRY & NEUROLOGY

## 2019-04-26 RX ORDER — SERTRALINE HYDROCHLORIDE 25 MG/1
25 TABLET, FILM COATED ORAL DAILY
Qty: 30 TABLET | Refills: 3 | Status: SHIPPED | OUTPATIENT
Start: 2019-04-27

## 2019-04-26 RX ORDER — CLONAZEPAM 0.5 MG/1
TABLET ORAL
Qty: 11 TABLET | Refills: 0 | Status: SHIPPED | OUTPATIENT
Start: 2019-04-26 | End: 2019-05-01

## 2019-04-26 RX ADMIN — Medication 10 ML: at 09:22

## 2019-04-26 RX ADMIN — DIAZEPAM 10 MG: 5 TABLET ORAL at 15:35

## 2019-04-26 RX ADMIN — DIAZEPAM 10 MG: 5 TABLET ORAL at 06:36

## 2019-04-26 RX ADMIN — METHADONE HYDROCHLORIDE 90 MG: 10 SOLUTION ORAL at 09:21

## 2019-04-26 RX ADMIN — SERTRALINE HYDROCHLORIDE 25 MG: 50 TABLET ORAL at 09:22

## 2019-04-26 ASSESSMENT — PAIN SCALES - GENERAL
PAINLEVEL_OUTOF10: 4
PAINLEVEL_OUTOF10: 0

## 2019-04-26 NOTE — PROGRESS NOTES
intact memory, no confusion, normal speech, normal language, no hallucination or delusion   CRANIAL NERVES: II     -       Pupils reactive b/l; Fundus exam: intact venous pulsations; Visual fields intact to confrontation  III,IV,VI -  EOMs full, no afferent defect, no                      SHOBHA, no ptosis  V     -     Normal facial sensation  VII    -     Normal facial symmetry  VIII   -     Intact hearing  IX,X -     Symmetrical palate  XI    -     Symmetrical shoulder shrug  XII   -     Midline tongue, no atrophy    MOTOR FUNCTION:  Normal bulk, normal tone, normal power;  no involuntary movements, no tremor   SENSORY FUNCTION:  Normal touch, normal pin, normal vibration, normal proprioception   CEREBELLAR FUNCTION:  Intact fine motor control over upper limbs   REFLEX FUNCTION:  Symmetric, no perverted reflex, no Babinski sign   STATION and GAIT  Not tested         Data:    Lab Results:     Lab Results   Component Value Date    ALT 71 (H) 04/24/2019    AST 81 (H) 04/24/2019     Hematology:  Recent Labs     04/24/19  1357 04/24/19  1821   WBC 10.0 11.4*   HGB 11.5* 11.3*   HCT 36.5 35.8*   * 454*     Chemistry:  Recent Labs     04/24/19  1357 04/25/19  0556 04/26/19  0547    139 137   K 3.8 3.9 3.8    103 100   CO2 20 24 26   GLUCOSE 136* 97 90   BUN 8 8 11   CREATININE 0.90 0.74 0.94*   CALCIUM 9.2 8.6 8.5*     Recent Labs     04/24/19  1357   PROT 7.8   LABALBU 4.6   AST 81*   ALT 71*       No results found for: PHENYTOIN, PHENYTOIN, VALPROATE, CBMZ        Diagnostic data reviewed:  CT head 4/24/19: No acute intracranial abnormality. Toxicology 4/24/19: negative for ethanol. EEG (4/25/19): done predominantly during wakefulness and it is abnormal with large amplitude slow background with infrequent sharp wave discharges of uncertain clinical significance.  Recommend repeating EEG in am.     Rpt EEG (4/26/19): done during drowsiness and sleep is normal.           Impression and Plan: Ms. Lina Arriaga is a 41 y.o. female with   New onset seizure; benzodiazepine withdrawal seizure; would not recommend initiating her on AED. Rpt EEG is negative. Chronic anxiety disorder/ bipolar disorder; might benefit from psych eval and also to initiate clonazepam 0.5 mg bid but I will defer it to primary team.   Hx of substance abuse; patient is presently on Methadone 10mg qd as per primary team.     Discussed with patient and Nurse. Can be discharged from neurology perspective with follow up appointment with me in 4-6 weeks. Neurology service is signing off. Please call us again if there are any significant changes or questions.

## 2019-04-26 NOTE — PLAN OF CARE
Problem: Falls - Risk of:  Goal: Will remain free from falls  Description  Will remain free from falls  4/26/2019 0329 by Tiffanie Alonzo RN  Outcome: Ongoing  Note:   Pt alert and oriented. Pt was instructed on how to use the call light. Pt used call light appropriately. Non-slip socks are in place. Bed is in lowest and locked position. No falls noted this shift.

## 2019-04-26 NOTE — DISCHARGE INSTR - COC
Continuity of Care Form    Patient Name: Aidan Longoria   :  1980  MRN:  [id number]    Admit date:  2019  Discharge date:  ***    Code Status Order: Full Code   Advance Directives:   Advance Care Flowsheet Documentation     Date/Time Healthcare Directive Type of Healthcare Directive Copy in [address] Agent's Name Healthcare Agent's Phone Number    [phone]  No, patient does not have an advance directive for healthcare treatment -- -- -- -- --          Admitting Physician:  Lukas Jarvis MD  PCP: Ronny Randhawa PA-C    Discharging Nurse: [facility] Unit/Room#: 2094/2094-01  Discharging Unit Phone Number: ***    Emergency Contact:   Extended Emergency Contact Information  Primary Emergency Contact: Duncan Longoria  Address: [facility] [address], [address] Phone: [phone]  Relation: Spouse    Past Surgical History:  Past Surgical History:   Procedure Laterality Date     SECTION  12/10/2017    1       Immunization History: There is no immunization history on file for this patient.     Active Problems:  Patient Active Problem List   Diagnosis Code    Benzodiazepine withdrawal with complication (HCC) Y74.900    Provoked seizure ([facility] R56.9    Bipolar affective disorder ([facility] F31.9    History of substance abuse Z87.898    New onset seizure ([facility] R56.9    Abnormal EEG R94.01       Isolation/Infection:   Isolation          No Isolation            Nurse Assessment:  Last Vital Signs: /72   Pulse 80   Temp 98 °F (36.7 °C) (Oral)   Resp 16   Ht 5' 7\" (1.702 m)   Wt 201 lb 11.5 oz (91.5 kg)   LMP 2019 (Approximate)   SpO2 94%   BMI 31.59 kg/m²     Last documented pain score (0-10 scale): Pain Level: 0  Last Weight:   Wt Readings from Last 1 Encounters:   19 201 lb 11.5 oz (91.5 kg)     Mental Status:  {IP PT MENTAL STATUS:16067}    IV Access:  [address] JESSICA IV PPPJDI:215912249}    Nursing Mobility/ADLs:  Walking   {CHP DME SWAE:062724577}  Transfer  {CHP DME LIHJ:509222275}  Bathing  {CHP DME QCHM:086013225}  Dressing  {CHP DME HUSA:896039462}  Toileting  {CHP DME HDRJ:719766312}  Feeding  {CHP DME HVIK:521787278}  Med Admin  {CHP DME VQIT:045457889}  Med Delivery   { JESSICA MED Delivery:389990581}    Wound Care Documentation and Therapy:        Elimination:  Continence:   · Bowel: {YES / WW:69187}  · Bladder: {YES / HM:05464}  Urinary Catheter: {Urinary Catheter:110564142}   Colostomy/Ileostomy/Ileal Conduit: {YES / NE:92084}       Date of Last BM: ***    Intake/Output Summary (Last 24 hours) at 2019 1814  Last data filed at 2019 1255  Gross per 24 hour   Intake 910 ml   Output --   Net 910 ml     I/O last 3 completed shifts:   In: 46 [P.O.:910]  Out: -     Safety Concerns:     [address] Safety Concerns:248172882}    Impairments/Disabilities:      [address] Impairments/Disabilities:835050963}    Nutrition Therapy:  Current Nutrition Therapy:   [address] Diet List:507644568}    Routes of Feeding: {P DME Other Feedings:250874480}  Liquids: {Slp liquid thickness:12555}  Daily Fluid Restriction: {CHP DME Yes amt example:334344893}  Last Modified Barium Swallow with Video (Video Swallowing Test): {Done Not Done OASZ:477895546}    Treatments at the Time of Hospital Discharge:   Respiratory Treatments: ***  Oxygen Therapy:  {Therapy; copd oxygen:67105}  Ventilator:    { CC Vent KROD:230279160}    Rehab Therapies: {THERAPEUTIC INTERVENTION:1904038260}  Weight Bearing Status/Restrictions: [address] Weight Bearin}  Other Medical Equipment (for information only, NOT a DME order):  {EQUIPMENT:370957336}  Other Treatments: ***    Patient's personal belongings (please select all that are sent with patient):  {CHP DME Belongings:278246223}    RN SIGNATURE:  {Esignature:982135454}    CASE MANAGEMENT/SOCIAL WORK SECTION    Inpatient Status Date: ***    Readmission Risk

## 2019-04-26 NOTE — PROCEDURES
[address]                 [address] [facility], [address]                          ELECTROENCEPHALOGRAM REPORT    PATIENT NAME: Nargis Geronimo                      :        1980  MED REC NO:   [id number]                              ROOM:       2094  ACCOUNT NO:   [de-identified]                           ADMIT DATE: 2019  PROVIDER:     Keila Bishop    DATE OF EE2019    HISTORY:  This is a 72-year-old female with new onset seizures. She  presented with benzodiazepine withdrawal.  She has been taking Xanax  everyday off of the street and she stopped taking Xanax 2 days ago and  has had a witnessed seizure activity, followed by prolonged postictal  state. DESCRIPTION OF THE PROCEDURE:  Electrodes were applied using paste in  positions dictated by the international 10-20 system of placement. Reviewing montages included both referential and bipolar derivations. In addition to EEG data, EKG and eye movements were recorded. This is a  routine recording. This test was performed on 2019. DESCRIPTION OF ACTIVITIES:  At the onset of the study, the patient is  awake and during wakefulness, this study demonstrated 5-6 Hz 100-200  microvolt theta activity. High amplitude mixed beta and theta activity  noted in bilateral anterior head regions. This study is also  significant for intermittent sharp wave discharges. Photic stimulation  did not induce posterior driving responses. Hyperventilation was  performed for 3 minutes with fair effort induces significant buildup of  bilateral slow wave activity. Electrocardiogram montage revealed normal  sinus rhythm. ELECTRODIAGNOSTIC INTERPRETATION:  This EEG performed predominantly  during wakefulness is abnormal with diffusely slow background in theta  frequencies with intermittent sharp wave discharges of uncertain  clinical significance.   Recommend a repeat study for improved yield of  the study. Clinical correlation is recommended.         Arely Chris    D: 04/25/2019 15:13:23       T: 04/25/2019 15:15:00     [email or web address]  Job#: [id number]     Doc#: [id number]    CC:

## 2019-04-26 NOTE — PROCEDURES
[address] [facility]                 [address] [facility], [facility] [address]                          ELECTROENCEPHALOGRAM REPORT    PATIENT NAME: Swathi Nichols                      :        1980  MED REC NO:   [id number]                              ROOM:       2094  ACCOUNT NO:   [de-identified]                           ADMIT DATE: 2019  PROVIDER:     Carter Bishop    DATE OF EE2019    HISTORY:  This is a 43-year-old female with new-onset seizures. She  presented with benzodiazepine withdrawal.  She had initial EEG on  2019 and it is demonstrating abnormalities with occasional sharp  wave discharges of uncertain clinical significance. She is being  evaluated with a repeat study for improved yield of the study. MEDICATIONS:  Not listed. DESCRIPTION OF THE PROCEDURE:  Electrodes were applied using paste in  positions dictated by the international 10-20 system of placement. Reviewing montages included both referential and bipolar derivations. In addition to EEG data, EKG and eye movements were recorded. This is a  routine recording. This test was performed on 2019. DESCRIPTION OF ACTIVITIES:  At the onset of the study, the patient is  drowsy and during drowsiness, background activity demonstrated 8-9 Hz  alpha activity. Blink artifacts are noted occurring symmetrically. During hyperventilation, background activity demonstrated diffusely slow  background and these slow waves did not evolve into rhythmic  electrographic seizures. Photic stimulation did not induce posterior  driving responses. Electrocardiogram montage revealed normal sinus  rhythm. This study did not demonstrate any ongoing electrographic  seizure activity. No epileptiform discharge was noted. ELECTRODIAGNOSTIC INTERPRETATION:  This EEG performed during drowsiness  and sleep is essentially unremarkable. No epileptiform discharges  noted.   No electrographic seizures noted. Clinical correlation is  recommended.         Pee Funk    D: 04/26/2019 15:24:02       T: 04/26/2019 15:26:13     [email or web address]  Job#: [id number]     Doc#: [id number]    CC:

## 2019-04-26 NOTE — CARE COORDINATION
ONGOING DISCHARGE PLAN:    Spoke with patient regarding discharge plan and patient confirms that plan is to go home with no needs. Probable discharge home today. Follow up appt made for 4/30/19 at 4;15p.m. With patients PCP Trupti Hickman NP. Will continue to follow for additional discharge needs. Electronically signed by Nehemias Rios RN on 4/26/2019 at 9:35 AM    Writer spoke with patient regarding about any different services she uses outside hospital.  Patient advised she just started progress to  going to [facility], she will call and make an appt with them once she gets home. Also advised patient of follow up appt with Trupti Hickman, she was agreeable to appt time and date.     Electronically signed by Nehemias Rios RN on 4/26/2019 at 3:04 PM

## 2019-05-01 NOTE — DISCHARGE SUMMARY
[address]    Discharge Summary     Patient ID: Cathy Longoria  :  1980   MRN: [id number]     ACCOUNT:  [de-identified]   Patient's PCP: Richmond Hudson PA-C  Admit Date: 2019   Discharge Date: 2019     Length of Stay: 1  Code Status:  Prior  Admitting Physician: Cira Acosta MD  Discharge Physician: Aleida Meyrs MD     Active Discharge Diagnoses:       Primary Problem  Benzodiazepine withdrawal with complication [facility])      [place] Problems    Diagnosis Date Noted    Abnormal EEG [R94.01]     Provoked seizure ([facility] [address] [R56.9]     [facility] [F31.9]     History of substance abuse [Z87.898]     New onset seizure ([facility] [address] [R56.9]     Benzodiazepine withdrawal with complication [facility]) [O69.152] 2019       Admission Condition:  fair     Discharged Condition: good    Hospital Stay:       Hospital Course:   Patient is a is 79-year-old female with a past medical history of anxiety and depression presenting today with seizures. Patient has been states that patient was at [address] [place] [address] office for her children when she started seizing and had postictal state for 30 minutes. Patient was previously taking 3-5 mg Xanax daily off the street. 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  2 days ago patient decided to stop. Last dose was 3 mg 2 days ago. Patient denies any head trauma, tremors. Patient has no focal neurological deficits. Then today had seizure lasting for an unspecified amount of time. Postictal for 30 minutes was witnessed. In the ED, vitals were stable. CMP showed a patient with ALT and AST. EKG was negative for acute change. CT head was negative for acute intracranial abnormality. Neurology was consulted, requested EEG.   Patient is admitted for further management of benzodiazepine withdrawal.    No seizures noted. EEG inconclusive. OK to DC        Significant therapeutic interventions: EEG    Significant Diagnostic Studies:   Labs / Micro:  CBC:   Lab Results   Component Value Date    WBC 10.9 04/26/2019    RBC 4.91 04/26/2019    RBC 3.67 05/12/2017    RBC 0-2 05/12/2017    HGB 11.8 04/26/2019    HCT 38.1 04/26/2019    MCV 77.6 04/26/2019    MCH 24.1 04/26/2019    MCHC 31.0 04/26/2019    RDW 17.1 04/26/2019     04/26/2019     BMP:    Lab Results   Component Value Date    GLUCOSE 90 04/26/2019     04/26/2019    K 3.8 04/26/2019     04/26/2019    CO2 26 04/26/2019    ANIONGAP 11 04/26/2019    BUN 11 04/26/2019    CREATININE 0.94 04/26/2019    BUNCRER NOT REPORTED 04/26/2019    CALCIUM 8.5 04/26/2019    LABGLOM >60 04/26/2019    GFRAA >60 04/26/2019    GFR      04/26/2019    GFR NOT REPORTED 04/26/2019       Radiology:    Ct Head Wo Contrast    Result Date: 4/24/2019  EXAMINATION: CT OF THE HEAD WITHOUT CONTRAST  4/24/2019 2:31 pm TECHNIQUE: CT of the head was performed without the administration of intravenous contrast. Dose modulation, iterative reconstruction, and/or weight based adjustment of the mA/kV was utilized to reduce the radiation dose to as low as reasonably achievable. COMPARISON: March 18, 2008 HISTORY: ORDERING SYSTEM PROVIDED HISTORY: new onset seizure TECHNOLOGIST PROVIDED HISTORY: Ordering Physician Provided Reason for Exam: new onset seizures today Acuity: Unknown Type of Exam: Unknown FINDINGS: BRAIN/VENTRICLES: There is no acute intracranial hemorrhage, mass effect or midline shift. No abnormal extra-axial fluid collection. The gray-white differentiation is maintained. There is no evidence of hydrocephalus. ORBITS: The visualized portion of the orbits demonstrate no acute abnormality. SINUSES: The visualized paranasal sinuses and mastoid air cells demonstrate no acute abnormality.  SOFT TISSUES/SKULL:  No acute abnormality of the visualized skull or soft tissues. No acute intracranial abnormality. Consultations:    Consults:     Final Specialist Recommendations/Findings:   IP CONSULT TO NEUROLOGY  IP CONSULT TO INTERNAL MEDICINE  IP CONSULT TO SOCIAL WORK      The patient was seen and examined on day of discharge and this discharge summary is in conjunction with any daily progress note from day of discharge. Discharge plan:       Disposition: Home    Physician Follow Up:     Itz Hermosillo PA-C  [address] [address]  [phone]    Go on 4/30/2019  [facility] Follow up at 4:15p.m.,     [address] [place] [address]  [phone]  Schedule an appointment as soon as possible for a visit  [facility] Follow up, please call and schedule an appt. Requiring Further Evaluation/Follow Up POST HOSPITALIZATION/Incidental Findings:     Diet: regular diet    Activity: As tolerated    Instructions to Patient:     Discharge Medications:      Medication List      START taking these medications    clonazePAM 0.5 MG tablet  Commonly known as:  KLONOPIN  Take 1 tablet by mouth 2 times daily for 3 days, THEN 0.5 tablets 2 times daily for 3 days, THEN 0.5 tablets daily for 3 days.   Start taking on:  4/26/2019     sertraline 25 MG tablet  Commonly known as:  ZOLOFT  Take 1 tablet by mouth daily        CONTINUE taking these medications    bisacodyl 5 MG EC tablet  Commonly known as:  DULCOLAX     methadone 10 MG/ML solution  Commonly known as:  DOLOPHINE     naproxen 500 MG tablet  Commonly known as:  NAPROSYN  Take 1 tablet by mouth 2 times daily (with meals)     sodium chloride 0.9 % SOLN with methadone 10 MG/ML SOLN 1 mg/mL     traZODone 100 MG tablet  Commonly known as:  DESYREL           Where to Get Your Medications      These medications were sent to [address], [address] [facility] [phone]

## 2019-05-15 LAB
EKG ATRIAL RATE: 83 BPM
EKG P AXIS: 61 DEGREES
EKG P-R INTERVAL: 124 MS
EKG Q-T INTERVAL: 374 MS
EKG QRS DURATION: 86 MS
EKG QTC CALCULATION (BAZETT): 439 MS
EKG R AXIS: 32 DEGREES
EKG T AXIS: 23 DEGREES
EKG VENTRICULAR RATE: 83 BPM

## 2021-06-16 ENCOUNTER — TELEPHONE (OUTPATIENT)
Dept: INTERNAL MEDICINE CLINIC | Age: 41
End: 2021-06-16

## 2021-06-16 NOTE — LETTER
[address]  [address] [place] [address]          06/16/2021     Dear Rizwan Longoria: You recently missed a scheduled appointment with Ray Whitaker NP on 06/16/2021. This is the scheduledappointment that you have missed within the last twelve months. If you miss 2 more appointment, you may be dismissed from the practice. It is your responsibility to arrive to your appointment. Please call our office as soon as possible so that we may reschedule your appointment, because your health and follow-up medical care are important to us. For future appointments that you are unable to keep, please call the office at [phone] at least 24 hours in advance to cancel and reschedule. If a traditional appointment is difficult for you to make, please keep in mind, we offer E-Visits for several diagnoses as well as virtual visits. We also have primary care walk-in clinics available. For more information on these options, please contact our office.    Sincerely,        [address]  [address] [place] [address]

## 2021-06-16 NOTE — TELEPHONE ENCOUNTER
Mailed patient no show appointment  letter #1 for the date of 06/16/2021,scheduled with Fatoumata Urban. Attempted to contact patient as well to inform of missed appointment but # provided not taking calls at this time.

## 2025-08-12 ENCOUNTER — HOSPITAL ENCOUNTER (EMERGENCY)
Age: 45
Discharge: HOME | End: 2025-08-12
Attending: STUDENT IN AN ORGANIZED HEALTH CARE EDUCATION/TRAINING PROGRAM
Payer: MEDICAID

## 2025-08-12 VITALS
TEMPERATURE: 98.1 F | HEIGHT: 68 IN | WEIGHT: 165 LBS | BODY MASS INDEX: 25.01 KG/M2 | HEART RATE: 80 BPM | SYSTOLIC BLOOD PRESSURE: 106 MMHG | RESPIRATION RATE: 16 BRPM | DIASTOLIC BLOOD PRESSURE: 58 MMHG | OXYGEN SATURATION: 95 %

## 2025-08-12 DIAGNOSIS — M25.561 CHRONIC PAIN OF BOTH KNEES: ICD-10-CM

## 2025-08-12 DIAGNOSIS — M25.562 CHRONIC PAIN OF BOTH KNEES: ICD-10-CM

## 2025-08-12 DIAGNOSIS — Z04.1 ENCOUNTER FOR EXAMINATION FOLLOWING MOTOR VEHICLE COLLISION (MVC): Primary | ICD-10-CM

## 2025-08-12 DIAGNOSIS — G89.29 CHRONIC PAIN OF BOTH KNEES: ICD-10-CM

## 2025-08-12 PROCEDURE — 99282 EMERGENCY DEPT VISIT SF MDM: CPT

## 2025-08-12 ASSESSMENT — PAIN - FUNCTIONAL ASSESSMENT: PAIN_FUNCTIONAL_ASSESSMENT: 0-10

## 2025-08-12 ASSESSMENT — PAIN SCALES - GENERAL: PAINLEVEL_OUTOF10: 0
